# Patient Record
Sex: MALE | Race: WHITE | Employment: FULL TIME | URBAN - METROPOLITAN AREA
[De-identification: names, ages, dates, MRNs, and addresses within clinical notes are randomized per-mention and may not be internally consistent; named-entity substitution may affect disease eponyms.]

---

## 2022-07-02 ENCOUNTER — APPOINTMENT (OUTPATIENT)
Dept: CT IMAGING | Age: 52
DRG: 066 | End: 2022-07-02
Payer: COMMERCIAL

## 2022-07-02 ENCOUNTER — HOSPITAL ENCOUNTER (INPATIENT)
Age: 52
LOS: 1 days | Discharge: HOME | DRG: 066 | End: 2022-07-03
Attending: EMERGENCY MEDICINE | Admitting: PSYCHIATRY & NEUROLOGY
Payer: COMMERCIAL

## 2022-07-02 ENCOUNTER — APPOINTMENT (OUTPATIENT)
Dept: MRI IMAGING | Age: 52
DRG: 066 | End: 2022-07-02
Payer: COMMERCIAL

## 2022-07-02 DIAGNOSIS — I63.511 ACUTE RIGHT MCA STROKE (HCC): Primary | ICD-10-CM

## 2022-07-02 DIAGNOSIS — I63.511 ACUTE ISCHEMIC RIGHT MIDDLE CEREBRAL ARTERY (MCA) STROKE (HCC): ICD-10-CM

## 2022-07-02 PROBLEM — I63.9 STROKE DETERMINED BY CLINICAL ASSESSMENT (HCC): Status: ACTIVE | Noted: 2022-07-02

## 2022-07-02 LAB
ABSOLUTE EOS #: 0.14 K/UL (ref 0–0.44)
ABSOLUTE IMMATURE GRANULOCYTE: 0.04 K/UL (ref 0–0.3)
ABSOLUTE LYMPH #: 2.46 K/UL (ref 1.1–3.7)
ABSOLUTE MONO #: 0.75 K/UL (ref 0.1–1.2)
ANION GAP SERPL CALCULATED.3IONS-SCNC: 10 MMOL/L (ref 9–17)
BASOPHILS # BLD: 1 % (ref 0–2)
BASOPHILS ABSOLUTE: 0.05 K/UL (ref 0–0.2)
BUN BLDV-MCNC: 9 MG/DL (ref 6–20)
CALCIUM SERPL-MCNC: 8.6 MG/DL (ref 8.6–10.4)
CHLORIDE BLD-SCNC: 107 MMOL/L (ref 98–107)
CO2: 26 MMOL/L (ref 20–31)
CREAT SERPL-MCNC: 0.8 MG/DL (ref 0.7–1.2)
EOSINOPHILS RELATIVE PERCENT: 2 % (ref 1–4)
GFR AFRICAN AMERICAN: >60 ML/MIN
GFR NON-AFRICAN AMERICAN: >60 ML/MIN
GFR SERPL CREATININE-BSD FRML MDRD: ABNORMAL ML/MIN/{1.73_M2}
GLUCOSE BLD-MCNC: 104 MG/DL (ref 70–99)
HCT VFR BLD CALC: 44.5 % (ref 40.7–50.3)
HEMOGLOBIN: 14.9 G/DL (ref 13–17)
IMMATURE GRANULOCYTES: 0 %
INR BLD: 1.1
LYMPHOCYTES # BLD: 27 % (ref 24–43)
MCH RBC QN AUTO: 30.5 PG (ref 25.2–33.5)
MCHC RBC AUTO-ENTMCNC: 33.5 G/DL (ref 28.4–34.8)
MCV RBC AUTO: 91 FL (ref 82.6–102.9)
MONOCYTES # BLD: 8 % (ref 3–12)
MYOGLOBIN: 54 NG/ML (ref 28–72)
NRBC AUTOMATED: 0 PER 100 WBC
PARTIAL THROMBOPLASTIN TIME: 24.7 SEC (ref 20.5–30.5)
PDW BLD-RTO: 12.5 % (ref 11.8–14.4)
PLATELET # BLD: 189 K/UL (ref 138–453)
PMV BLD AUTO: 11.2 FL (ref 8.1–13.5)
POTASSIUM SERPL-SCNC: 4 MMOL/L (ref 3.7–5.3)
PROTHROMBIN TIME: 11.2 SEC (ref 9.1–12.3)
RBC # BLD: 4.89 M/UL (ref 4.21–5.77)
SEG NEUTROPHILS: 62 % (ref 36–65)
SEGMENTED NEUTROPHILS ABSOLUTE COUNT: 5.58 K/UL (ref 1.5–8.1)
SODIUM BLD-SCNC: 143 MMOL/L (ref 135–144)
TOTAL CK: 217 U/L (ref 39–308)
TROPONIN, HIGH SENSITIVITY: <6 NG/L (ref 0–22)
WBC # BLD: 9 K/UL (ref 3.5–11.3)

## 2022-07-02 PROCEDURE — 85025 COMPLETE CBC W/AUTO DIFF WBC: CPT

## 2022-07-02 PROCEDURE — 2060000002 HC BURN ICU INTERMEDIATE R&B

## 2022-07-02 PROCEDURE — 85610 PROTHROMBIN TIME: CPT

## 2022-07-02 PROCEDURE — 82553 CREATINE MB FRACTION: CPT

## 2022-07-02 PROCEDURE — 99285 EMERGENCY DEPT VISIT HI MDM: CPT

## 2022-07-02 PROCEDURE — 83874 ASSAY OF MYOGLOBIN: CPT

## 2022-07-02 PROCEDURE — 82550 ASSAY OF CK (CPK): CPT

## 2022-07-02 PROCEDURE — 70551 MRI BRAIN STEM W/O DYE: CPT

## 2022-07-02 PROCEDURE — 6370000000 HC RX 637 (ALT 250 FOR IP): Performed by: STUDENT IN AN ORGANIZED HEALTH CARE EDUCATION/TRAINING PROGRAM

## 2022-07-02 PROCEDURE — 80048 BASIC METABOLIC PNL TOTAL CA: CPT

## 2022-07-02 PROCEDURE — 93005 ELECTROCARDIOGRAM TRACING: CPT | Performed by: STUDENT IN AN ORGANIZED HEALTH CARE EDUCATION/TRAINING PROGRAM

## 2022-07-02 PROCEDURE — 85730 THROMBOPLASTIN TIME PARTIAL: CPT

## 2022-07-02 PROCEDURE — 84484 ASSAY OF TROPONIN QUANT: CPT

## 2022-07-02 PROCEDURE — 99223 1ST HOSP IP/OBS HIGH 75: CPT | Performed by: PSYCHIATRY & NEUROLOGY

## 2022-07-02 PROCEDURE — 70496 CT ANGIOGRAPHY HEAD: CPT

## 2022-07-02 PROCEDURE — 6360000004 HC RX CONTRAST MEDICATION: Performed by: EMERGENCY MEDICINE

## 2022-07-02 PROCEDURE — 6370000000 HC RX 637 (ALT 250 FOR IP): Performed by: PSYCHIATRY & NEUROLOGY

## 2022-07-02 RX ORDER — CLOPIDOGREL 300 MG/1
300 TABLET, FILM COATED ORAL ONCE
Status: COMPLETED | OUTPATIENT
Start: 2022-07-02 | End: 2022-07-02

## 2022-07-02 RX ORDER — LABETALOL HYDROCHLORIDE 5 MG/ML
10 INJECTION, SOLUTION INTRAVENOUS EVERY 10 MIN PRN
Status: DISCONTINUED | OUTPATIENT
Start: 2022-07-02 | End: 2022-07-03 | Stop reason: HOSPADM

## 2022-07-02 RX ORDER — ATORVASTATIN CALCIUM 80 MG/1
40 TABLET, FILM COATED ORAL NIGHTLY
Status: DISCONTINUED | OUTPATIENT
Start: 2022-07-02 | End: 2022-07-03 | Stop reason: HOSPADM

## 2022-07-02 RX ORDER — POLYETHYLENE GLYCOL 3350 17 G/17G
17 POWDER, FOR SOLUTION ORAL DAILY PRN
Status: DISCONTINUED | OUTPATIENT
Start: 2022-07-02 | End: 2022-07-03 | Stop reason: HOSPADM

## 2022-07-02 RX ORDER — ASPIRIN 81 MG/1
81 TABLET ORAL DAILY
Status: DISCONTINUED | OUTPATIENT
Start: 2022-07-02 | End: 2022-07-03 | Stop reason: HOSPADM

## 2022-07-02 RX ORDER — ENOXAPARIN SODIUM 100 MG/ML
30 INJECTION SUBCUTANEOUS 2 TIMES DAILY
Status: DISCONTINUED | OUTPATIENT
Start: 2022-07-02 | End: 2022-07-03 | Stop reason: HOSPADM

## 2022-07-02 RX ORDER — CLOPIDOGREL BISULFATE 75 MG/1
75 TABLET ORAL DAILY
Status: DISCONTINUED | OUTPATIENT
Start: 2022-07-03 | End: 2022-07-03 | Stop reason: HOSPADM

## 2022-07-02 RX ORDER — ONDANSETRON 2 MG/ML
4 INJECTION INTRAMUSCULAR; INTRAVENOUS EVERY 6 HOURS PRN
Status: DISCONTINUED | OUTPATIENT
Start: 2022-07-02 | End: 2022-07-03 | Stop reason: HOSPADM

## 2022-07-02 RX ORDER — ASPIRIN 300 MG/1
300 SUPPOSITORY RECTAL DAILY
Status: DISCONTINUED | OUTPATIENT
Start: 2022-07-02 | End: 2022-07-03 | Stop reason: HOSPADM

## 2022-07-02 RX ORDER — ONDANSETRON 4 MG/1
4 TABLET, ORALLY DISINTEGRATING ORAL EVERY 8 HOURS PRN
Status: DISCONTINUED | OUTPATIENT
Start: 2022-07-02 | End: 2022-07-03 | Stop reason: HOSPADM

## 2022-07-02 RX ADMIN — Medication 81 MG: at 16:16

## 2022-07-02 RX ADMIN — CLOPIDOGREL BISULFATE 300 MG: 300 TABLET, FILM COATED ORAL at 14:51

## 2022-07-02 RX ADMIN — IOPAMIDOL 90 ML: 755 INJECTION, SOLUTION INTRAVENOUS at 12:47

## 2022-07-02 ASSESSMENT — ENCOUNTER SYMPTOMS
SHORTNESS OF BREATH: 0
RHINORRHEA: 0
SORE THROAT: 0
COUGH: 0
NAUSEA: 0
ABDOMINAL PAIN: 0
VOMITING: 0
DIARRHEA: 0

## 2022-07-02 ASSESSMENT — PAIN - FUNCTIONAL ASSESSMENT: PAIN_FUNCTIONAL_ASSESSMENT: NONE - DENIES PAIN

## 2022-07-02 NOTE — PROGRESS NOTES
[facility]     Emergency/Trauma Note    PATIENT NAME: Marsisa Velazquez    Shift date: 07/02/2022  Shift day: Saturday   Shift # 1    Room # 16/16   Name: Marissa Velazquez            Age: 47 y.o. Gender: male          Sikh: None   Place of Alevism:     Trauma/Incident type: Stroke Consult  Admit Date & Time: 7/2/2022 12:22 PM  TRAUMA NAME: None    ADVANCE DIRECTIVES IN CHART? No    NAME OF DECISION MAKER: Unknown    RELATIONSHIP OF DECISION MAKER TO PATIENT:     PATIENT/EVENT DESCRIPTION:  Marissa Velazquez is a 47 y.o. male who arrived ED as stroke consult. Patient to be admitted to 16/16. SPIRITUAL ASSESSMENT-INTERVENTION-OUTCOME:  No spiritual assessment was carried. However, patient was receptive to spiritual care and open to prayer. Family was not present at the time.  maintained listening presence, offered support, prayed with patient and reassured him that he was in good hands. Patient expressed appreciation for the spiritual support he received. PATIENT BELONGINGS:  This  did not handle patient's belongings. ANY BELONGINGS OF SIGNIFICANT VALUE NOTED:  Unknown    REGISTRATION STAFF NOTIFIED? Yes    WHAT IS YOUR SPIRITUAL CARE PLAN FOR THIS PATIENT?:  Follow up visits recommended for ongoing assessment of patient's condition and for more prayers and support. Electronically signed by Fr. Jennifer Huerta on 7/2/2022 at 3:19 PM.  Patrick Swenson  [phone]

## 2022-07-02 NOTE — CONSULTS
Department of Endovascular Neurosurgery                                                                                                                      Resident Consult Note  Stroke Alert paged @ 12:22 Pm   ER Room #  16  Arrival to patient bedside @ 12:30 Pm        Reason for Consult:   Left facial numbness   Requesting Physician:   Jennifer Zaragoza   Endovascular Neurosurgeon:   []Dr. Dmitriy Ford  [x]Dr. Sebastien Porter  []Dr. Dexter Bryan   []    History Obtained From:  patient    CHIEF COMPLAINT:       Sudden onset of left perioral numbness, slurring of speech, dysphagia    HISTORY OF PRESENT ILLNESS:       The patient is a 47 y.o. male who presents with sudden onset of left perioral numbness, slurring of speech, dysphagia to [facility]. Telestroke consulted CT head subtle hypodensity right frontal , patient transferred to [facility] for further management. Loaded with  aspirin 325 before transfer    Last know well: 5 PM on 91/2022  47year-old-year-old male without significant past medical history developed sudden onset of left perioral numbness, slurring of speech on 7/1/2022, around 5 PM later that evening also developed difficulty in swallowing. At [facility] facility blood pressure 150s, blood glucose 150. Patient was given aspirin and transferred to [facility]. On presentation:  BP: 140's  BSL: 104   CT head and neck: Pending  NIH 3      Patient tested positive for Covid-19 2 weeks ago and completed quarantine     Prior to arrival patient was on  Antiplatelets/anticoagulants: No  Statins: No    Smoking history: non-smoker       PAST MEDICAL HISTORY :       Past Medical History:    History reviewed. No pertinent past medical history. Past Surgical History:    History reviewed. No pertinent surgical history.     Social History:   Social History     Socioeconomic History    Marital status:      Spouse name: Not on file    Number of children: Not on file    Years of education: Not on file    Highest education level: Not on file   Occupational History    Not on file   Tobacco Use    Smoking status: Never Smoker    Smokeless tobacco: Never Used   Substance and Sexual Activity    Alcohol use: Yes     Comment: 4 beers daily    Drug use: Never    Sexual activity: Not on file   Other Topics Concern    Not on file   Social History Narrative    Not on file     Social Determinants of Health     Financial Resource Strain:     Difficulty of Paying Living Expenses: Not on file   Food Insecurity:     Worried About Running Out of Food in the Last Year: Not on file    [employer] of Food in the Last Year: Not on file   Transportation Needs:     Lack of Transportation (Medical): Not on file    Lack of Transportation (Non-Medical): Not on file   Physical Activity:     Days of Exercise per Week: Not on file    Minutes of Exercise per Session: Not on file   Stress:     Feeling of Stress : Not on file   Social Connections:     Frequency of Communication with Friends and Family: Not on file    Frequency of Social Gatherings with Friends and Family: Not on file    Attends Sikh Services: Not on file    Active Member of [address] [place] [address] [employer] or Organizations: Not on file    Attends Club or Organization Meetings: Not on file    Marital Status: Not on file   Intimate Partner Violence:     Fear of Current or Ex-Partner: Not on file    Emotionally Abused: Not on file    Physically Abused: Not on file    Sexually Abused: Not on file   Housing Stability:     Unable to Pay for Housing in the Last Year: Not on file    Number of [place] in the Last Year: Not on file    Unstable Housing in the Last Year: Not on file       Family History:   History reviewed. No pertinent family history. Allergies:  Patient has no known allergies.     Home Medications:  Prior to Admission medications    Not on File       Current Medications:   No current facility-administered medications for this encounter. REVIEW OF SYSTEMS:       CONSTITUTIONAL: negative for fatigue and malaise   EYES: negative for double vision and photophobia    HEENT: negative for tinnitus and sore throat   RESPIRATORY: negative for cough, shortness of breath   CARDIOVASCULAR: negative for chest pain, palpitations   GASTROINTESTINAL: negative for nausea, vomiting   GENITOURINARY: negative for incontinence   MUSCULOSKELETAL: negative for neck or back pain   NEUROLOGICAL: negative for seizures   PSYCHIATRIC: negative for fatigue     Review of systems otherwise negative. PHYSICAL EXAM:       BP (!) 146/81   Pulse 71   Temp 97.4 °F (36.3 °C) (Oral)   Resp 13   Wt 240 lb (108.9 kg)   SpO2 98%     CONSTITUTIONAL:  Well developed, well nourished, alert and oriented x 3, in no acute distress. GCS 15, nontoxic. Mild  Dysarthria , no aphasia .    HEAD:  normocephalic, atraumatic    EYES:  PERRLA, EOMI.   ENT:  moist mucous membranes   NECK:  supple, symmetric, no midline tenderness to palpation    BACK:  without midline tenderness, step-offs or deformities    LUNGS:  Equal air entry bilaterally   CARDIOVASCULAR:  normal s1 / s2   ABDOMEN:  Soft, no rigidity   NEUROLOGIC:  Mental Status:  A & O x3,awake             Cranial Nerves:    cranial nerves II-XII are grossly intact    Motor Exam:    Drift:  absent  Tone:  normal    Motor exam is symmetrical 5 out of 5 all extremities bilaterally    Sensory:    Touch:    Right Upper Extremity:  normal  Left Upper Extremity:  normal  Right Lower Extremity:  normal  Left Lower Extremity:  normal    Deep Tendon Reflexes:    Right Bicep:  2+  Left Bicep:  2+  Right Knee:  2+  Left Knee:  2+    Plantar Response:  Right:  downgoing  Left:  downgoing    Clonus:  absent  Haskins's:  absent    Coordination/Dysmetria:  Heel to Shin:  Right:  normal  Finger to Nose:   Right:  normal  Left:  normal   Dysdiadochokinesia:  absent    Gait:  Deferred     INITIAL NIH STROKE SCALE:    Time Performed:  12:35  PM     1a. Level of consciousness:  0 - alert; keenly responsive  1b. Level of consciousness questions:  0 - answers both questions correctly  1c. Level of consciousness questions:  0 - performs both tasks correctly  2. Best Gaze:  0 - normal  3. Visual:  0 - no visual loss  4. Facial Palsy:  1 - minor paralysis (flattened nasolabial fold, asymmetric on smiling)  5a. Motor left arm:  0 - no drift, limb holds 90 (or 45) degrees for full 10 seconds  5b. Motor right arm:  0 - no drift, limb holds 90 (or 45) degrees for full 10 seconds  6a. Motor left le - no drift; leg holds 30 degree position for full 5 seconds  6b. Motor right le - no drift; leg holds 30 degree position for full 5 seconds  7. Limb Ataxia:  0 - absent  8. Sensory:  1 - mild to moderate sensory loss; patient feels pinprick is less sharp or is dull on the affected side; there is a loss of superficial pain with pinprick but patient is aware of being touched   9. Best Language:  0 - no aphasia, normal  10. Dysarthria:  1 - mild to moderate, patient slurs at least some words and at worst, can be understood with some difficulty  11.   Extinction and Inattention:  0 - no abnormality    TOTAL:  3     SKIN:  no rash      Modified Frio Score Scale:     [x] Zero: No symptoms at all   [] 1: No significant disability despite symptoms; able to carry out all usual duties and activities   [] 2: Slight disability; unable to carry out all previous activities, but able to look after own affairs without assistance   [] 3:Moderate disability; requiring some help, but able to walk without assistance   [] 4: Moderately severe disability; unable to walk and attend to bodily needs without assistance   [] 5:Severe disability; bedridden, incontinent and requiring constant nursing care and attention    LABS AND IMAGING:     CBC with Differential:    Lab Results   Component Value Date/Time    WBC 9.0 07/02/2022 12:37 PM    RBC 4.89 07/02/2022 12:37 PM    HGB 14.9 07/02/2022 12:37 PM    HCT 44.5 07/02/2022 12:37 PM     07/02/2022 12:37 PM    MCV 91.0 07/02/2022 12:37 PM    MCH 30.5 07/02/2022 12:37 PM    MCHC 33.5 07/02/2022 12:37 PM    RDW 12.5 07/02/2022 12:37 PM    LYMPHOPCT 27 07/02/2022 12:37 PM    MONOPCT 8 07/02/2022 12:37 PM    BASOPCT 1 07/02/2022 12:37 PM    MONOSABS 0.75 07/02/2022 12:37 PM    LYMPHSABS 2.46 07/02/2022 12:37 PM    EOSABS 0.14 07/02/2022 12:37 PM    BASOSABS 0.05 07/02/2022 12:37 PM         BMP:    Lab Results   Component Value Date/Time     07/02/2022 12:37 PM    K 4.0 07/02/2022 12:37 PM     07/02/2022 12:37 PM    CO2 26 07/02/2022 12:37 PM    BUN 9 07/02/2022 12:37 PM    CREATININE 0.80 07/02/2022 12:37 PM    CALCIUM 8.6 07/02/2022 12:37 PM    GFRAA >60 07/02/2022 12:37 PM    LABGLOM >60 07/02/2022 12:37 PM    GLUCOSE 104 07/02/2022 12:37 PM       Radiology Review:    1.) CT brain without contrast: At outlying facility  No evidence of hemorrhage. Very subtle cortical hypodensity in the right frontal lobe within right MCA distribution quickly presented with a subtle acute cortical infarct. 2.) CTA Head/Neck:    No LVO     3.) Brain MRI W/O:        4.) ECHO with bubble study: Pending     ASSESSMENT AND PLAN:     There is no problem list on file for this patient. Assessment                 47 y.o. male who presents with sudden onset of left perioral numbness, slurring of speech and later on developed  swallowing difficulty    1. Last Known Well (date and time): 1700 7/1/2022    2. Candidate for IV tPA therapy     Yes []     No  [x] due to the following exclusion criteria:  Out of time window     3.  Candidate for Thrombectomy    Yes []      No [x] due to the following exclusion criteria: NO LVO     - Discussed with Dr. Alla Campos      Recommendations:    [x] General Neurology Care Status - prefer 5th floor (5A/5C)   [] Internal Medicine General Care Status [] NICU Status - (5B)     [] MICU Status   [] Observation Status    Please use the following admission order set for stroke admission:   [] [id number] - ALISHA Intercerebral Hemorrhage Admission   [] [id number] - ALISHA Sub Arachnoid Hemorrhage Admission   [] [id number] - ALISHA Ischemic Stroke TPA Treatment Focused   [] [id number] - IP Ischemic Stroke ICU Post Alteplase (TPA) Admission    [] [id number] - GEN Ischemic Stroke Non-Thrombolytic Focussed      Imaging   - CT Head  WO : done   - CTA Head and Neck : done   - MRI Brain WO : Done   - ECHO with bubble study: Pending       Medications   -Aspirin 325 x 1   -  Aspirin 81  mg daily   - Folic acid 1mg BID  - Atorvastatin 40 mg nightly     Labs  - Fasting Lipid panel  - HgbA1c lab      - PT, OT, Speech eval   - Hydrate with 250cc bolus then IVF NS @ 75cc/hr   - Telemetry   - Neuro checks per protocol  - We recommend SBP <220  - Blood glucose goal less than 180  - Please avoid dextrose containing solutions        Additional recommendations may follow    Please contact [facility] with any changes in patients neurologic status. Thank you for your consult.        Jude Ruelas MD   PGY 3 Neurology Resident  7/2/2022 at 1:39 PM

## 2022-07-02 NOTE — ED PROVIDER NOTES
[facility]     Emergency Department     Faculty Attestation    I performed a history and physical examination of the patient and discussed management with the resident. I have reviewed and agree with the residents findings including all diagnostic interpretations, and treatment plans as written at the time of my review. Any areas of disagreement are noted on the chart. I was personally present for the key portions of any procedures. I have documented in the chart those procedures where I was not present during the key portions. For Physician Assistant/ Nurse Practitioner cases/documentation I have personally evaluated this patient and have completed at least one if not all key elements of the E/M (history, physical exam, and MDM). Additional findings are as noted. This patient was evaluated in the Emergency Department for symptoms described in the history of present illness. The patient was evaluated in the context of the global COVID-19 pandemic, which necessitated consideration that the patient might be at risk for infection with the SARS-CoV-2 virus that causes COVID-19. Institutional protocols and algorithms that pertain to the evaluation of patients at risk for COVID-19 are in a state of rapid change based on information released by regulatory bodies including the CDC and federal and state organizations. These policies and algorithms were followed during the patient's care in the ED. Primary Care Physician: No primary care provider on file. History: This is a 47 y.o. male who presents to the Emergency Department with complaint of left-sided lip and tongue tingling as well as facial tingling facial droop and dysarthria. This began approximately 1700 hrs. yesterday. Patient was seen outlying facility CT scan was concerning for a hypodense area in the brain and the patient was transferred for further neurological evaluation.     Physical: weight is 240 lb (108.9 kg). His oral temperature is 97.4 °F (36.3 °C). His blood pressure is 146/81 (abnormal) and his pulse is 71. His respiration is 13 and oxygen saturation is 98%. Patient does have some dysarthria, left facial droop is mild,    Impression: CVA    Plan: Stroke alert was paged out on the patient upon his arrival.  Further evaluation testing and disposition per the stroke team.      CRITICAL CARE: There was a high probability of clinically significant/life threatening deterioration in this patient's condition which required my urgent intervention. Total critical care time was 5 minutes. This excludes any time for separately reportable procedures. (Please note that portions of this note were completed with a voice recognition program.  Efforts were made to edit the dictations but occasionally words are mis-transcribed.)    Jeancarlos Zapien.  Rahul Walker MD, [facility]  Attending Emergency Medicine Physician        Roland Rinne, MD  07/02/22 1033

## 2022-07-02 NOTE — ED NOTES
Pt. Resting on stretcher, eyes open, RR even and non-labored  Pt.  Updated on POC  Will continue to monitor   Family remains at bedside      Antoine Dang RN  07/02/22 [id number]

## 2022-07-02 NOTE — ED NOTES
Pt. Arrives to ED via [facility] from [place] for stroke consult. LKWT 5pm yesterday with facial numbness and slurred speech. Pt. States on arrival slurred speech is still present, numbness improving.        Tali Byrd RN  07/02/22 0775

## 2022-07-02 NOTE — H&P
[facility] Neurology   [address]    HISTORY AND PHYSICAL EXAMINATION            Date:   7/2/2022  Patient name:  Ana María Low  Date of admission:  7/2/2022 12:22 PM  MRN:   [id number]  Account:  [de-identified]  YOB: 1970  PCP:    No primary care provider on file. Room:   [facility]  Code Status:    No Order    History Obtained From:  patient    CHIEF COMPLAINT:       Sudden onset of left perioral numbness, slurring of speech, dysphagia    HISTORY OF PRESENT ILLNESS:       The patient is a 47 y.o. male who presents with sudden onset of left perioral numbness, slurring of speech[facility] Tel[facility]roke consulted CT head subtle hypodensity right frontal , patient transferred to [facility] for further management. Patient was loaded with aspirin 325 before transfer. Last know well: 5 PM on 91/2022  47year-old-year-old male without significant past medical history developed sudden onset of left perioral numbness, slurring of speech on 7/1/2022, around 5 PM later that evening also developed difficulty in swallowing. At [facility] facility blood pressure 150s, blood glucose 150. Patient was given aspirin and transferred to [facility]. On presentation:  BP: 140's  BSL: 104   CT head and neck: Pending  NIH 3      Patient tested positive for Covid-19 2 weeks ago and completed quarantine     Prior to arrival patient was on  Antiplatelets/anticoagulants: No  Statins: No    Smoking history: non-smoker       PAST MEDICAL HISTORY :       Past Medical History:    History reviewed. No pertinent past medical history. Past Surgical History:    History reviewed. No pertinent surgical history.     Social History:   Social History     Socioeconomic History    Marital status:      Spouse name: Not on file    Number of children: Not on file    Years of education: Not on file    Highest education level: Not on file   Occupational History  Not on file   Tobacco Use    Smoking status: Never Smoker    Smokeless tobacco: Never Used   Substance and Sexual Activity    Alcohol use: Yes     Comment: 4 beers daily    Drug use: Never    Sexual activity: Not on file   Other Topics Concern    Not on file   Social History Narrative    Not on file     Social Determinants of Health     Financial Resource Strain:     Difficulty of Paying Living Expenses: Not on file   Food Insecurity:     Worried About Running Out of Food in the Last Year: Not on file    Jessica of Food in the Last Year: Not on file   Transportation Needs:     Lack of Transportation (Medical): Not on file    Lack of Transportation (Non-Medical): Not on file   Physical Activity:     Days of Exercise per Week: Not on file    Minutes of Exercise per Session: Not on file   Stress:     Feeling of Stress : Not on file   Social Connections:     Frequency of Communication with Friends and Family: Not on file    Frequency of Social Gatherings with Friends and Family: Not on file    Attends [employer] Services: Not on file    Active Member of [employer] or Organizations: Not on file    Attends Club or Organization Meetings: Not on file    Marital Status: Not on file   Intimate Partner Violence:     Fear of Current or Ex-Partner: Not on file    Emotionally Abused: Not on file    Physically Abused: Not on file    Sexually Abused: Not on file   Housing Stability:     Unable to Pay for Housing in the Last Year: Not on file    Number of [place] in the Last Year: Not on file    Unstable Housing in the Last Year: Not on file       Family History:   History reviewed. No pertinent family history. Allergies:  Patient has no known allergies. Home Medications:  Prior to Admission medications    Not on File       Current Medications:   No current facility-administered medications for this encounter.     REVIEW OF SYSTEMS:       CONSTITUTIONAL: negative for fatigue and malaise   EYES: 0 - answers both questions correctly  1c. Level of consciousness questions:  0 - performs both tasks correctly  2. Best Gaze:  0 - normal  3. Visual:  0 - no visual loss  4. Facial Palsy:  1 - minor paralysis (flattened nasolabial fold, asymmetric on smiling)  5a. Motor left arm:  0 - no drift, limb holds 90 (or 45) degrees for full 10 seconds  5b. Motor right arm:  0 - no drift, limb holds 90 (or 45) degrees for full 10 seconds  6a. Motor left le - no drift; leg holds 30 degree position for full 5 seconds  6b. Motor right le - no drift; leg holds 30 degree position for full 5 seconds  7. Limb Ataxia:  0 - absent  8. Sensory:  1 - mild to moderate sensory loss; patient feels pinprick is less sharp or is dull on the affected side; there is a loss of superficial pain with pinprick but patient is aware of being touched   9. Best Language:  0 - no aphasia, normal  10. Dysarthria:  1 - mild to moderate, patient slurs at least some words and at worst, can be understood with some difficulty  11.   Extinction and Inattention:  0 - no abnormality    TOTAL:  3     SKIN:  no rash      Modified Beach Score Scale:     [x] Zero: No symptoms at all   [] 1: No significant disability despite symptoms; able to carry out all usual duties and activities   [] 2: Slight disability; unable to carry out all previous activities, but able to look after own affairs without assistance   [] 3:Moderate disability; requiring some help, but able to walk without assistance   [] 4: Moderately severe disability; unable to walk and attend to bodily needs without assistance   [] 5:Severe disability; bedridden, incontinent and requiring constant nursing care and attention    LABS AND IMAGING:     CBC with Differential:    Lab Results   Component Value Date/Time    WBC 9.0 2022 12:37 PM    RBC 4.89 2022 12:37 PM    HGB 14.9 2022 12:37 PM    HCT 44.5 2022 12:37 PM     2022 12:37 PM MCV 91.0 07/02/2022 12:37 PM    MCH 30.5 07/02/2022 12:37 PM    MCHC 33.5 07/02/2022 12:37 PM    RDW 12.5 07/02/2022 12:37 PM    LYMPHOPCT 27 07/02/2022 12:37 PM    MONOPCT 8 07/02/2022 12:37 PM    BASOPCT 1 07/02/2022 12:37 PM    MONOSABS 0.75 07/02/2022 12:37 PM    LYMPHSABS 2.46 07/02/2022 12:37 PM    EOSABS 0.14 07/02/2022 12:37 PM    BASOSABS 0.05 07/02/2022 12:37 PM         BMP:    Lab Results   Component Value Date/Time     07/02/2022 12:37 PM    K 4.0 07/02/2022 12:37 PM     07/02/2022 12:37 PM    CO2 26 07/02/2022 12:37 PM    BUN 9 07/02/2022 12:37 PM    CREATININE 0.80 07/02/2022 12:37 PM    CALCIUM 8.6 07/02/2022 12:37 PM    GFRAA >60 07/02/2022 12:37 PM    LABGLOM >60 07/02/2022 12:37 PM    GLUCOSE 104 07/02/2022 12:37 PM       Radiology Review:    1.) CT brain without contrast: At outlying facility  No evidence of hemorrhage. Very subtle cortical hypodensity in the right frontal lobe within right MCA distribution quickly presented with a subtle acute cortical infarct. 2.) CTA Head/Neck:    No LVO     3.) Brain MRI W/O:    1. Acute infarct in the right frontal lobe.  No evidence of hemorrhagic   transformation. 2. Involutional parenchymal changes with mild microvascular ischemic disease. 4.) ECHO with bubble study: Pending     ASSESSMENT AND PLAN:     There is no problem list on file for this patient. Assessment                 47 y.o. male who presents with sudden onset of left perioral numbness, slurring of speech and later on developed  swallowing difficulty    1. Last Known Well (date and time): 1700   7/1/2022    2. Candidate for IV tPA therapy     Yes []     No  [x] due to the following exclusion criteria:  Out of time window     3.  Candidate for Thrombectomy    Yes []      No [x] due to the following exclusion criteria: NO LVO     - Discussed with Dr. Isa Summers      Recommendations:    [x] General Neurology Care Status - prefer 5th floor (5A/5C)   [] Internal Medicine General Care Status   [] NICU Status - (5B)     [] MICU Status   [] Observation Status    Please use the following admission order set for stroke admission:   [] [id number] - ALISHA Intercerebral Hemorrhage Admission   [] [id number] - ALISHA Sub Arachnoid Hemorrhage Admission   [] [id number] - ALISHA Ischemic Stroke TPA Treatment Focused   [] [id number] - IP Ischemic Stroke ICU Post Alteplase (TPA) Admission    [] [id number] - GEN Ischemic Stroke Non-Thrombolytic Focussed      Imaging   - CT Head  WO : done   - CTA Head and Neck : done   - MRI Brain WO : Done   - ECHO with bubble study: Pending       Medications   -Patient was loaded with aspirin 325  -  Aspirin 81  mg daily   - Folic acid 1mg BID  - Atorvastatin 40 mg nightly     Labs  - Fasting Lipid panel  - HgbA1c lab      - PT, OT, Speech eval   - Hydrate with 250cc bolus then IVF NS @ 75cc/hr   - Telemetry   - Neuro checks per protocol  - We recommend SBP <220  - Blood glucose goal less than 180  - Please avoid dextrose containing solutions           Radha Kovacs MD   PGY 3 Neurology Resident  7/2/2022 at 2:05 PM

## 2022-07-02 NOTE — ED PROVIDER NOTES
[address]  ED  Emergency Department Encounter  Emergency Medicine Resident     Pt Name: Terry You  MRN: [id number]  [place] 1970  Date of evaluation: 7/2/22  PCP:  No primary care provider on file. CHIEF COMPLAINT       Chief Complaint   Patient presents with    Aphasia     LKWT 5pm yesterday, Prem transfer       HISTORY OFPRESENT ILLNESS  (Location/Symptom, Timing/Onset, Context/Setting, Quality, Duration, Modifying Factors,Severity.)      Terry You is a 47 y.o. male who presents as a transfer from [facility].  Patient says that yesterday around 5 PM he noticed some left-sided lip and tongue tingling and left-sided facial tingling. He then noticed some facial droop and slurred speech. He was seen at outlying facility, had CT scan performed which was notable for hypodense area in the brain concerning for stroke. Patient was transferred here for stroke evaluation. He denies any recent past medical history other than COVID infection 2 weeks ago. He is currently asymptomatic. No known medical history of hypertension, hyperlipidemia, diabetes. He says that his dad had a stroke and his mom has a history of hypertension. He did not take any medications, is not on any blood thinners. PAST MEDICAL / SURGICAL / SOCIAL / FAMILY HISTORY      has no past medical history on file. has no past surgical history on file. Social:  reports that he has never smoked. He has never used smokeless tobacco. He reports current alcohol use. He reports that he does not use drugs. Family Hx: History reviewed. No pertinent family history. Allergies:  Patient has no known allergies. Home Medications:  Prior to Admission medications    Not on File       REVIEW OFSYSTEMS    (2-9 systems for level 4, 10 or more for level 5)      Review of Systems   Constitutional: Negative for appetite change, chills, fatigue and fever.    HENT: Negative for congestion, rhinorrhea, sneezing and sore throat. Eyes: Negative for visual disturbance. Respiratory: Negative for cough and shortness of breath. Cardiovascular: Negative for chest pain and leg swelling. Gastrointestinal: Negative for abdominal pain, diarrhea, nausea and vomiting. Genitourinary: Negative for dysuria. Musculoskeletal: Negative for myalgias, neck pain and neck stiffness. Skin: Negative for rash and wound. Neurological: Positive for facial asymmetry, speech difficulty and numbness. Negative for dizziness, syncope, light-headedness and headaches. Psychiatric/Behavioral: Negative for dysphoric mood and suicidal ideas. PHYSICAL EXAM   (up to 7 for level 4, 8 or more forlevel 5)      INITIAL VITALS:   Vitals:    07/02/22 1706   BP:    Pulse:    Resp:    Temp:    SpO2: 97%        Physical Exam  Vitals and nursing note reviewed. Constitutional:       General: He is not in acute distress. Appearance: Normal appearance. He is normal weight. He is not ill-appearing, toxic-appearing or diaphoretic. HENT:      Nose: Nose normal.      Mouth/Throat:      Mouth: Mucous membranes are moist.      Pharynx: Oropharynx is clear. Eyes:      Extraocular Movements: Extraocular movements intact. Conjunctiva/sclera: Conjunctivae normal.      Pupils: Pupils are equal, round, and reactive to light. Cardiovascular:      Rate and Rhythm: Normal rate and regular rhythm. Pulses: Normal pulses. Heart sounds: Normal heart sounds. Pulmonary:      Effort: Pulmonary effort is normal.      Breath sounds: Normal breath sounds. Abdominal:      General: There is no distension. Palpations: Abdomen is soft. Tenderness: There is no abdominal tenderness. There is no right CVA tenderness, left CVA tenderness or guarding. Musculoskeletal:         General: Normal range of motion. Cervical back: Normal range of motion and neck supple. Skin:     General: Skin is warm.       Capillary Refill: Capillary refill takes less than 2 seconds. Neurological:      Mental Status: He is alert and oriented to person, place, and time. GCS: GCS eye subscore is 4. GCS verbal subscore is 5. GCS motor subscore is 6. Cranial Nerves: Dysarthria and facial asymmetry present. Sensory: Sensory deficit present. Motor: Motor function is intact. Coordination: Coordination is intact. Comments: No involvement of extremities   Psychiatric:         Mood and Affect: Mood normal.         Behavior: Behavior normal.         DIFFERENTIAL  DIAGNOSIS     Initial MDM/Plan: 47 y.o. male who presents with left facial droop, left facial numbness and dysarthria with last known well 5 PM.  CT head at outlying facility showed hypodense area in the brain. NIH scale of 3 for facial numbness, facial paralysis and dysarthria. Physical examination otherwise unremarkable. Patient transferred here for stroke work-up. Will obtain stroke work-up, stroke alert noncritical.  Patient outside the tPA window. DIAGNOSTIC RESULTS / EMERGENCYDEPARTMENT COURSE / MDM     LABS:  Labs Reviewed   STROKE PANEL - Abnormal; Notable for the following components:       Result Value    Glucose 104 (*)     All other components within normal limits   URINE DRUG SCREEN   HEMOGLOBIN A1C     RADIOLOGY:  CTA HEAD NECK W CONTRAST    Result Date: 7/2/2022  EXAMINATION: CTA OF THE HEAD AND NECK WITH CONTRAST 7/2/2022 12:40 pm: TECHNIQUE: CTA of the head and neck was performed with the administration of intravenous contrast. Multiplanar reformatted images are provided for review. MIP images are provided for review. Stenosis of the internal carotid arteries measured using NASCET criteria. Automated exposure control, iterative reconstruction, and/or weight based adjustment of the mA/kV was utilized to reduce the radiation dose to as low as reasonably achievable. COMPARISON: None.  HISTORY: ORDERING SYSTEM PROVIDED HISTORY: LEFT FACIAL NUMBNESS TECHNOLOGIST PROVIDED HISTORY: LEFT FACIAL NUMBNESS Decision Support Exception - unselect if not a suspected or confirmed emergency medical condition->Emergency Medical Condition (MA) FINDINGS: CTA NECK: AORTIC ARCH/ARCH VESSELS: There is a normal branch pattern of the aortic arch. The innominate and subclavian arteries are patent. CAROTID ARTERIES: The common carotid and internal carotid arteries are normal in caliber without evidence of a flow-limiting stenosis or dissection. VERTEBRAL ARTERIES: The vertebral arteries are patent without evidence of a flow-limiting dist stenosis or dissection. SOFT TISSUES: The parapharyngeal fat spaces are preserved. No pharyngeal or laryngeal mass. Thyroid gland, submandibular, and parotid glands are unremarkable. No adenopathy the or soft tissue swelling. Limited images through the lung apices are unremarkable. BONES: Degenerative changes are noted in the spine. There is streak artifact related to dental amalgam.  There is likely sequela of remote C7 spinous process fracture. CTA HEAD: ANTERIOR CIRCULATION: Fibrocalcific plaque is noted in the carotid siphons without evidence of a flow-limiting stenosis. 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 The anterior and middle cerebral arteries are unremarkable. POSTERIOR CIRCULATION: Basilar artery is patent. The posterior cerebral arteries are unremarkable. OTHER: No gross dural venous sinus thrombosis. BRAIN: There are no areas of abnormal enhancement. No midline shift. Unremarkable CTA of the head and neck. MRI BRAIN WO CONTRAST    Result Date: 7/2/2022  EXAMINATION: MRI OF THE BRAIN WITHOUT CONTRAST  7/2/2022 1:33 pm TECHNIQUE: Multiplanar multisequence MRI of the brain was performed without the administration of intravenous contrast. COMPARISON: No MRI comparison available.  HISTORY: ORDERING SYSTEM PROVIDED HISTORY: R PERIORAL NUMBNESS AND MILD DYSPHAGIA TECHNOLOGIST PROVIDED HISTORY: R PERIORAL NUMBNESS AND MILD DYSPHAGIA Decision Support Exception - unselect if not a suspected or confirmed emergency medical condition->Emergency Medical Condition (MA) Reason for Exam: R PERIORAL NUMBNESS AND MILD DYSPHAGIA FINDINGS: INTRACRANIAL STRUCTURES/VENTRICLES: Acute infarct in the right frontal lobe. No evidence of hemorrhagic transformation. No mass effect or midline shift. No evidence of an acute intracranial hemorrhage. Scattered periventricular, deep, and subcortical white matter T2/FLAIR hyperintensities are nonspecific and likely related to microvascular ischemic disease. Involutional parenchymal changes. No evidence of hydrocephalus. The sellar/suprasellar regions appear unremarkable. The normal signal voids within the major intracranial vessels appear maintained. ORBITS: The visualized portion of the orbits demonstrate no acute abnormality. SINUSES: The visualized paranasal sinuses and mastoid air cells demonstrate no acute abnormality. BONES/SOFT TISSUES: The bone marrow signal intensity appears normal. The soft tissues demonstrate no acute abnormality. 1. Acute infarct in the right frontal lobe. No evidence of hemorrhagic transformation. 2. Involutional parenchymal changes with mild microvascular ischemic disease. EKG  EKG Interpretation    Interpreted by emergency department physician    Rhythm: normal sinus   Rate: normal  Axis: normal  Ectopy: none  Conduction: normal  ST Segments: normal  T Waves: normal  Q Waves: none    Clinical Impression: non-specific EKG    Isadora Venegas MD      All EKG's are interpreted by the Emergency Department Physicianwho either signs or Co-signs this chart in the absence of a cardiologist.    EMERGENCY DEPARTMENT COURSE:    admitted to neurology for acute right MCA stroke      PROCEDURES:  None    CONSULTS:  IP CONSULT TO PHYSICAL MEDICINE REHAB      FINAL IMPRESSION      1.  Acute right MCA stroke ([address]        DISPOSITION / PLAN     DISPOSITION Admitted 07/02/2022 02:20:50 PM      PATIENT REFERRED TO:  No follow-up provider specified.     DISCHARGE MEDICATIONS:  New Prescriptions    No medications on file       Phyllis Tesfaye MD  Emergency Medicine Resident    (Please note that portions of this note were completed with a voice recognition program.Efforts were made to edit the dictations but occasionally words are mis-transcribed.)      Phyllis Tesfaye MD  Resident  07/02/22 [id number]

## 2022-07-03 VITALS
RESPIRATION RATE: 22 BRPM | HEIGHT: 67 IN | TEMPERATURE: 97.7 F | HEART RATE: 83 BPM | WEIGHT: 240 LBS | DIASTOLIC BLOOD PRESSURE: 83 MMHG | SYSTOLIC BLOOD PRESSURE: 145 MMHG | OXYGEN SATURATION: 97 % | BODY MASS INDEX: 37.67 KG/M2

## 2022-07-03 LAB
CHOLESTEROL/HDL RATIO: 4
CHOLESTEROL: 144 MG/DL
ESTIMATED AVERAGE GLUCOSE: 114 MG/DL
HBA1C MFR BLD: 5.6 % (ref 4–6)
HCT VFR BLD CALC: 46.3 % (ref 40.7–50.3)
HDLC SERPL-MCNC: 36 MG/DL
HEMOGLOBIN: 15.5 G/DL (ref 13–17)
LDL CHOLESTEROL: 82 MG/DL (ref 0–130)
MCH RBC QN AUTO: 30.8 PG (ref 25.2–33.5)
MCHC RBC AUTO-ENTMCNC: 33.5 G/DL (ref 28.4–34.8)
MCV RBC AUTO: 92 FL (ref 82.6–102.9)
NRBC AUTOMATED: 0 PER 100 WBC
PDW BLD-RTO: 12.5 % (ref 11.8–14.4)
PLATELET # BLD: 180 K/UL (ref 138–453)
PMV BLD AUTO: 10.9 FL (ref 8.1–13.5)
RBC # BLD: 5.03 M/UL (ref 4.21–5.77)
TRIGL SERPL-MCNC: 132 MG/DL
WBC # BLD: 7.6 K/UL (ref 3.5–11.3)

## 2022-07-03 PROCEDURE — 85027 COMPLETE CBC AUTOMATED: CPT

## 2022-07-03 PROCEDURE — 6360000002 HC RX W HCPCS: Performed by: STUDENT IN AN ORGANIZED HEALTH CARE EDUCATION/TRAINING PROGRAM

## 2022-07-03 PROCEDURE — 36415 COLL VENOUS BLD VENIPUNCTURE: CPT

## 2022-07-03 PROCEDURE — 97161 PT EVAL LOW COMPLEX 20 MIN: CPT

## 2022-07-03 PROCEDURE — 97165 OT EVAL LOW COMPLEX 30 MIN: CPT

## 2022-07-03 PROCEDURE — 6370000000 HC RX 637 (ALT 250 FOR IP): Performed by: STUDENT IN AN ORGANIZED HEALTH CARE EDUCATION/TRAINING PROGRAM

## 2022-07-03 PROCEDURE — 97535 SELF CARE MNGMENT TRAINING: CPT

## 2022-07-03 PROCEDURE — 83036 HEMOGLOBIN GLYCOSYLATED A1C: CPT

## 2022-07-03 PROCEDURE — 99239 HOSP IP/OBS DSCHRG MGMT >30: CPT | Performed by: PSYCHIATRY & NEUROLOGY

## 2022-07-03 PROCEDURE — 97116 GAIT TRAINING THERAPY: CPT

## 2022-07-03 PROCEDURE — 92523 SPEECH SOUND LANG COMPREHEN: CPT

## 2022-07-03 PROCEDURE — 80061 LIPID PANEL: CPT

## 2022-07-03 RX ORDER — ASPIRIN 81 MG/1
81 TABLET ORAL DAILY
Qty: 30 TABLET | Refills: 3 | Status: SHIPPED | OUTPATIENT
Start: 2022-07-04

## 2022-07-03 RX ORDER — CLOPIDOGREL BISULFATE 75 MG/1
75 TABLET ORAL DAILY
Qty: 20 TABLET | Refills: 0 | Status: SHIPPED | OUTPATIENT
Start: 2022-07-04 | End: 2022-07-03

## 2022-07-03 RX ORDER — ASPIRIN 81 MG/1
81 TABLET ORAL DAILY
Qty: 30 TABLET | Refills: 3 | Status: SHIPPED | OUTPATIENT
Start: 2022-07-04 | End: 2022-07-03

## 2022-07-03 RX ORDER — ATORVASTATIN CALCIUM 40 MG/1
40 TABLET, FILM COATED ORAL NIGHTLY
Qty: 30 TABLET | Refills: 3 | Status: SHIPPED | OUTPATIENT
Start: 2022-07-03 | End: 2022-07-03

## 2022-07-03 RX ORDER — ATORVASTATIN CALCIUM 40 MG/1
40 TABLET, FILM COATED ORAL NIGHTLY
Qty: 30 TABLET | Refills: 3 | Status: SHIPPED | OUTPATIENT
Start: 2022-07-03

## 2022-07-03 RX ORDER — CLOPIDOGREL BISULFATE 75 MG/1
75 TABLET ORAL DAILY
Qty: 20 TABLET | Refills: 0 | Status: SHIPPED | OUTPATIENT
Start: 2022-07-04 | End: 2022-07-24

## 2022-07-03 RX ADMIN — ENOXAPARIN SODIUM 30 MG: 100 INJECTION SUBCUTANEOUS at 09:01

## 2022-07-03 RX ADMIN — Medication 81 MG: at 09:01

## 2022-07-03 RX ADMIN — CLOPIDOGREL 75 MG: 75 TABLET, FILM COATED ORAL at 09:01

## 2022-07-03 RX ADMIN — ATORVASTATIN CALCIUM 40 MG: 80 TABLET, FILM COATED ORAL at 00:53

## 2022-07-03 RX ADMIN — ENOXAPARIN SODIUM 30 MG: 100 INJECTION SUBCUTANEOUS at 00:54

## 2022-07-03 NOTE — PROGRESS NOTES
Speech Language Pathology  Facility/Department: [address] BURN UNIT  Initial Speech/Language/Cognitive Assessment    NAME: Antwon Hansen  : 1970   MRN: [id number]  ADMISSION DATE: 2022  ADMITTING DIAGNOSIS: has Acute ischemic right middle cerebral artery (MCA) stroke ([facility] [address] on their problem list.    Date of Eval: 7/3/2022   Evaluating Therapist: JEWEL Johnson    Primary Complaint: This is a 47 y.o. male who presents to the Emergency Department with complaint of left-sided lip and tongue tingling as well as facial tingling facial droop and dysarthria. This began approximately 1700 hrs. yesterday. Patient was seen outlying facility CT scan was concerning for a hypodense area in the brain and the patient was transferred for further neurological evaluation. Pain:  Pain Assessment  Pain Assessment: None - Denies Pain    Assessment:  Pt presents with no apparent cognitive deficits at this time. Mild dysarthria noted, minimal left facial weakness. Pt. Reports his dysarthria has improved since admission. ST to follow up to address noted deficits. Education provided. Recommendations:  Requires SLP Intervention: Yes  Further therapy recommended at discharge. Frequency: 3-5 x week    Plan:   Goals:  Short-term Goals  Goal 1: Pt. will complete OMEX for facial weakness 10-20 x per session. Goal 2: Pt. will utilize dysarthria compensatory strategies to increase speech clarity.    Patient/family involved in developing goals and treatment plan: yes    Subjective:     Social/Functional History  Lives With: Family  Active : Yes  Occupation: Full time employment  Vision  Vision: Within Functional Limits  Hearing  Hearing: Within functional limits     Objective:    Expression  Primary Mode of Expression: Verbal    Pragmatics/Social Functioning  Pragmatics: Within functional limits     Dysarthria: mild impairment, minimal left facial weakness    Cognition:      Orientation  Overall Orientation Status: Within Functional Limits  Attention  Attention: Within Functional Limits  Memory  Memory: Within Functional Limits  Problem Solving  Problem Solving: Within Functional Limits  Abstract Reasoning  Abstract Reasoning: Within Functional Limits  Safety/Judgment  Safety/Judgment: Within Functional Limits  Word Associations:  WFL  Verbal Sequencing: WFL    Prognosis:  Speech Therapy Prognosis  Prognosis: Fair  Individuals consulted  Consulted and agree with results and recommendations: Patient;RN;Family member  Family member consulted: wife    Education:  Patient Education Response: Verbalizes understanding          Therapy Time:   Individual Concurrent Group Co-treatment   Time In  9897         Time Out  1042         Minutes  [address]  CCC-SLP  7/3/2022 10:57 AM

## 2022-07-03 NOTE — PROGRESS NOTES
Physical Therapy  Facility/Department: [address] BURN UNIT  Physical Therapy Initial Assessment    Name: Ana María Low  : 1970  MRN: [id number]  Date of Service: 7/3/2022    Discharge Recommendations:  No therapy recommended at discharge   PT Equipment Recommendations  Equipment Needed: No      Patient Diagnosis(es): The primary encounter diagnosis was Acute right MCA stroke ([address] A diagnosis of Acute ischemic right middle cerebral artery (MCA) stroke (HCC) was also pertinent to this visit. Past Medical History:  has no past medical history on file. Past Surgical History:  has no past surgical history on file. Assessment   Assessment: Pt ambulated 240ft w/ no AD with supervision provided for safety; pt demonstrate independence in performance of functional mobility and verbalizes no concerns regards to functional mobility upon discharge. D/C skilled PT. Therapy Prognosis: Good  Decision Making: Low Complexity  Requires PT Follow-Up: No  Activity Tolerance  Activity Tolerance: Patient tolerated evaluation without incident     Plan   Plan  Plan Comment: D/C skilled PT.   Safety Devices  Type of Devices: Left in chair,Call light within reach,Gait belt,Nurse notified  Restraints  Restraints Initially in Place: No     Restrictions  Restrictions/Precautions  Restrictions/Precautions: Up as Tolerated  Required Braces or Orthoses?: No  Position Activity Restriction  Other position/activity restrictions: SBP <220     Subjective   General  Patient assessed for rehabilitation services?: Yes  Response To Previous Treatment: Not applicable  Family / Caregiver Present: No  Follows Commands: Within Functional Limits  Subjective  Subjective: RN and pt in agreement for PT evaluation; pt supine in bed upon PT arrival, pt pleasant and cooperative throughout session         Social/Functional History  Social/Functional History  Lives With: Family (Spouse, daughter/son in law/ grandchildren (16 month- 7 years))  Type of Home: House  Home Layout: Two level (\"Split level\")  Home Access: Stairs to enter with rails  Entrance Stairs - Number of Steps: 9  Entrance Stairs - Rails: Both  Bathroom Shower/Tub: Tub/Shower unit  Bathroom Toilet: Standard  Home Equipment:  (No DME)  ADL Assistance: Independent  Homemaking Assistance: Independent  Homemaking Responsibilities: Yes (Shares with wife)  Meal Prep Responsibility: Secondary  Laundry Responsibility: Secondary  Cleaning Responsibility: Secondary  Shopping Responsibility: Secondary  Ambulation Assistance: Independent  Transfer Assistance: Independent  Active : Yes  Mode of Transportation: Truck  Occupation: Full time employment  Type of Occupation: Construction  Leisure & Hobbies: Fishing  Additional Comments: Pt reports prn assist from family upon discharge  Vision/Hearing  Vision  Vision: Impaired  Vision Exceptions: Wears glasses for reading  Hearing  Hearing: Within functional limits    Cognition   Orientation  Overall Orientation Status: Within Functional Limits  Cognition  Overall Cognitive Status: WFL     Objective   Gross Assessment  Sensation: Intact     Joint Mobility  ROM RLE: WFL  ROM LLE: WFL  ROM RUE: WFL  ROM LUE: WFL  Strength RLE  Strength RLE: WFL  Strength LLE  Strength LLE: WFL  Strength RUE  Strength RUE: WFL  Strength LUE  Strength LUE: WFL        Balance  Sitting: Intact (EOB for ~2-3 minutes)  Standing: Intact (sinkside ADLs for ~3-4 minutes cumulative)  Transfer Training  Transfer Training: Yes  Overall Level of Assistance: Independent  Sit to Stand: Independent  Stand to Sit: Independent  Gait  Overall Level of Assistance: Independent  Bed mobility  Supine to Sit: Modified independent  Sit to Supine:  (Did not assess- pt seated in bedside recliner upon writer's exit)  Transfers  Sit to Stand: Supervision  Stand to sit: Supervision  Ambulation  Surface: level tile  Device: No Device  Assistance: Supervision  Distance: 240ft  Comments: No LOB noted throughout session; pt reports gait is baseline  More Ambulation?: No  Stairs/Curb  Stairs?: No     Balance  Posture: Good  Sitting - Static: Good  Sitting - Dynamic: Good  Standing - Static: Good  Standing - Dynamic: Good  Comments: standing balance assessed w/ no AD; pt able to sit independently    AM-PAC Score     AM-PAC Inpatient Mobility without Stair Climbing Raw Score : 20 (07/03/22 1253)  AM-PAC Inpatient without Stair Climbing T-Scale Score : 60.57 (07/03/22 1253)  Mobility Inpatient CMS 0-100% Score: 0 (07/03/22 1253)  Mobility Inpatient without Stair CMS G-Code Modifier : CH (07/03/22 1253)    Education  Patient Education  Education Given To: Patient  Education Provided: Role of Therapy;Orientation  Education Method: Demonstration;Verbal  Barriers to Learning: None  Education Outcome: Verbalized understanding;Demonstrated understanding      Therapy Time   Individual Concurrent Group Co-treatment   Time In 0816         Time Out 0835         Minutes 19         Timed Code Treatment Minutes: 8 Minutes       Rosana Arteaga, PT

## 2022-07-03 NOTE — ED NOTES
Patient Health Questionnaire-9 (PHQ-9)    Over the last 2 weeks, how often have you been bothered by any of the following problems? 1. Little Interest or pleasure in doing things? [x] Not at all  [] Several Days  [] More than half the day  []  Nearly every day    2. Feeling down, depressed or hopeless? [x] Not at all  [] Several Days  [] More than half the day  []  Nearly every day    3. Trouble falling or staying asleep, or sleeping too much? [x] Not at all  [] Several Days  [] More than half the day  []  Nearly every day    4. Feeling tired or having little energy? [x] Not at all  [] Several Days  [] More than half the day  []  Nearly every day    5. Poor apettite or overeating? [x] Not at all  [] Several Days  [] More than half the day  []  Nearly every day    6. Feeling bad about yourself-or that you are a failure or have let yourself or your family down? [x] Not at all  [] Several Days  [] More than half the day  []  Nearly every day    7. Trouble concentrating on things, such as reading the newspaper or watching television? [x] Not at all  [] Several Days  [] More than half the day  []  Nearly every day    8. Moving or speaking so slowly that other people could have noticed? Or the opposite-being so fidgety or restless that you have been moving around a lot more than usual?   [x] Not at all  [] Several Days  [] More than half the day  []  Nearly every day    9. Thoughts that you would be better off dead or of hurting yourself in some way? [x] Not at all  [] Several Days  [] More than half the day  []  Nearly every day    Total Score: 0    If you checked off any problems, how difficult have these problems made it for you to do your work, take care of things at home, or get along with other people?    [x] Not difficult at all  [] Somewhat Difficult  [] Very Difficult  []  Extremely Difficult     Joshua Alicea, [facility] [place]  07/03/22 5189

## 2022-07-03 NOTE — PLAN OF CARE
Problem: Discharge Planning  Goal: Discharge to home or other facility with appropriate resources  7/3/2022 1230 by Triny Felder RN  Outcome: Progressing  7/3/2022 0606 by Gopi Patel RN  Outcome: Progressing     Problem: Safety - Adult  Goal: Free from fall injury  Outcome: Progressing

## 2022-07-03 NOTE — PROGRESS NOTES
CLINICAL PHARMACY NOTE: MEDS TO BEDS    Total # of Prescriptions Filled: 3   The following medications were delivered to the patient:  · PLAVIX 75   · ASA   · LIPITOR 40     Additional Documentation:    PAID WITH CARD

## 2022-07-03 NOTE — DISCHARGE SUMMARY
[facility]     Department of Neurology    INPATIENT DISCHARGE SUMMARY        Patient Identification:  Roberto Heaton is a 47 y.o. male. :  1970  MRN: [id number]     Acct: [de-identified]   Admit Date:  2022  Discharge date and time: 7/3/2022  Attending Provider: Isadora Zimmer DO                                     Admission Diagnoses:   Acute right MCA stroke ([facility] [I63.511]  Stroke determined by clinical assessment [facility]) [I63.9]    Discharge Diagnoses:   Principal Problem:    Acute ischemic right middle cerebral artery (MCA) stroke ([facility]  Resolved Problems:    * No resolved hospital problems. *       Consults:   none    Brief Inpatient course:    Patient is a 65-year-old male with no significant PMH, presented to [facility] facility with sudden onset left perioral numbness, slurring of speech and dysphagia. Patient was seen via telestroke subsequent CT head showed subtle hypodensity in the right frontal area. Patient was transferred to [facility] for further management. Patient was loaded with aspirin 325 mg prior to transfer. Upon arrival patient was seen as a stroke alert, had an NIH of 3. Blood pressure was in the 150s, glucose in the 150s. Patient was taken for CTA head and neck which was unremarkable. Patient subsequently underwent MRI brain without contrast which showed acute infarct of the right frontal lobe with no evidence of hemorrhage transformation. Patient was admitted to the neurology stepdown unit for monitoring. His blood pressures remained stable. Patient was found to have an LDL of 82, HDL 36. Hemoglobin A1c pending at time of discharge. Patient was started on Lipitor 40 mg nightly for secondary stroke prevention. Patient was scheduled to undergo 2D echo along with SPENCER and loop recorder but the test could not be completed over the weekend due to Fourth of July holiday weekend.   Patient worked with PT and OT, did well without any need for significant therapy. Speech therapy recommended further therapy at discharge. Plan was discussed with patient and decision was made to proceed with all of this testing as outpatient. Patient lives near [place] and will proceed with testing over there. Patient was provided outpatient prescription for today echocardiogram, SPENCER and loop recorder placement and consult to schedule appointments as soon as possible. Patient was also provided an external referral for speech therapy. Patient was counseled regarding the need for neurology and cardiology follow-up, stated that he would follow-up with the neurology team at [facility] or a physician close to his home, will decide at a later point. Patient was discharged home on aspirin and Plavix for 21 days, monotherapy with aspirin from thereon along with Lipitor 40 mg nightly. Patient was agreeable to discharge and follow-up plan, expressed understanding. Patient is stable from neurological standpoint to be discharged. PHYSICAL EXAM:        CONSTITUTIONAL:  Well developed, well nourished, alert and oriented x 3, in no acute distress. GCS 15, nontoxic. Mild dysarthria, no aphasia .    HEAD:  normocephalic, atraumatic    EYES:  PERRLA, EOMI.   ENT:  moist mucous membranes   NECK:  supple, symmetric, no midline tenderness to palpation    BACK:  without midline tenderness, step-offs or deformities    LUNGS:  Equal air entry bilaterally   CARDIOVASCULAR:  normal s1 / s2   ABDOMEN:  Soft, no rigidity   NEUROLOGIC:  Mental Status:  A & O x3,awake             Cranial Nerves:    cranial nerves II-XII are grossly intact except for mild left-sided facial asymmetry     Motor Exam:    Drift:  absent  Tone:  normal     Motor exam is symmetrical 5 out of 5 all extremities bilaterally     Sensory:    Touch:    Right Upper Extremity:  normal  Left Upper Extremity:  normal  Right Lower Extremity:  normal  Left Lower Extremity:  normal     Deep Tendon Reflexes:    Right Bicep: 2+  Left Bicep:  2+  Right Knee:  2+  Left Knee:  2+     Plantar Response:  Right:  downgoing  Left:  downgoing     Clonus:  absent  Haskins's:  absent     Coordination/Dysmetria:  Heel to Shin:  Right:  normal  Finger to Nose:   Right:  normal  Left:  normal   Dysdiadochokinesia:  absent     Gait:  Deferred       SKIN:  no rash         Procedures:  None    Any Hospital Acquired Infections: none    Discharge Functional Status:  stable    Disposition: home    Patient Instructions:   Compliance with medications  Follow-up with neurology and cardiology as outpatient  Get 2D echocardiogram, SPENCER and loop recorder placement done as soon as possible      Discharge Medications:  Current Discharge Medication List      START taking these medications    Details   aspirin 81 MG EC tablet Take 1 tablet by mouth daily  Qty: 30 tablet, Refills: 3      atorvastatin (LIPITOR) 40 MG tablet Take 1 tablet by mouth nightly  Qty: 30 tablet, Refills: 3      clopidogrel (PLAVIX) 75 MG tablet Take 1 tablet by mouth daily for 20 doses  Qty: 20 tablet, Refills: 0             Activity: activity as tolerated    Restrictions: Driving No, Swimming No, Operating heavy machinery No, Compromising heights No    Diet: regular diet    Follow-up:    [address], [address]  [phone]  In 4 weeks  Hospital follow-up      Follow up labs: None    Follow up imaging: None    Note that over 30 minutes was spent in preparing discharge papers, discussing discharge with patient, medication review, etc.      Raymon Jhaveri MD,  Department of Neurology  [address]  7/3/2022, 11:09 AM

## 2022-07-03 NOTE — PROGRESS NOTES
Occupational Therapy  Facility/Department: [address] BURN UNIT  Occupational Therapy Initial Assessment    Name: Alice Carr  : 1970  MRN: [id number]  Date of Service: 7/3/2022  Chief Complaint   Patient presents with    Aphasia     LKWT 5pm yesterday, Prem transfer     Discharge Recommendations:  No therapy recommended at discharge. Patient Diagnosis(es): The primary encounter diagnosis was Acute right MCA stroke ([address] A diagnosis of Acute ischemic right middle cerebral artery (MCA) stroke (HCC) was also pertinent to this visit. Past Medical History:  has no past medical history on file. Past Surgical History:  has no past surgical history on file. Assessment   Assessment: Patient is currently completing ADLs at baseline level with no acute needs identified at this time. Pt demonstrates completing ADLs and functional tasks independently and reports no concerns upon discharge. OT to defer further treatment at this time, please re-consult if functional deficits arise. Prognosis: Good  Decision Making: Low Complexity  REQUIRES OT FOLLOW-UP: No  Activity Tolerance  Activity Tolerance: Patient Tolerated treatment well        Plan         Restrictions  Restrictions/Precautions  Restrictions/Precautions: Up as Tolerated  Required Braces or Orthoses?: No  Position Activity Restriction  Other position/activity restrictions: SBP <220    Subjective   General  Patient assessed for rehabilitation services?: Yes  Family / Caregiver Present: No  General Comment  Comments: RN ok'd patient for OT /PT evaluation. Pt pleasant and cooperative throughout, agreeable to evaluation. Pt declines any pain throughout.      Social/Functional History  Social/Functional History  Lives With: Family (Spouse, daughter/son in law/ grandchildren (16 month- 7 years))  Type of Home: House  Home Layout: Two level (\"Split level\")  Home Access: Stairs to enter with rails  Entrance Stairs - Number of Steps: 9  Entrance Stairs - Rails: Both  Bathroom Shower/Tub: Tub/Shower unit  Bathroom Toilet: Standard  Home Equipment:  (No DME)  ADL Assistance: Independent  Homemaking Assistance: Independent  Homemaking Responsibilities: Yes (Shares with wife)  Meal Prep Responsibility: Secondary  Laundry Responsibility: Secondary  Cleaning Responsibility: Secondary  Shopping Responsibility: Secondary  Ambulation Assistance: Independent  Transfer Assistance: Independent  Active : Yes  Mode of Transportation: Truck  Occupation: Full time employment  Type of Occupation: Construction  Leisure & Hobbies: Fishing  Additional Comments: Pt reports prn assist from family upon discharge    Objective    Vision: WFL   Hearing: [email or web address] IDE   Safety Devices  Type of Devices: Left in chair;Call light within reach;Gait belt;Nurse notified  Restraints  Restraints Initially in Place: No  Balance  Sitting: Intact (EOB for ~2-3 minutes)  Standing: Intact (sinkside ADLs for ~3-4 minutes cumulative)  Transfer Training  Transfer Training: Yes  Overall Level of Assistance: Independent  Sit to Stand: Independent  Stand to Sit: Independent  Gait  Overall Level of Assistance: Independent     AROM: Within functional limits  Strength: Within functional limits (B gross strength 5/5)  Coordination: Within functional limits  Tone: Normal  Sensation: Intact  ADL  Feeding: Independent  Grooming: Independent  UE Bathing: Independent  LE Bathing: Independent  UE Dressing: Independent  LE Dressing: Independent  Toileting: Independent  Additional Comments: Patient donned B socks independently long sitting in bed. Pt able to stand sinkside and complete grooming task independently with no deficits observed.      Bed mobility  Supine to Sit: Independent  Scooting: Independent  Bed Mobility Comments: Pt retired to bedside recliner at end of session     Cognition  Overall Cognitive Status: WFL  Orientation  Overall Orientation Status: Within Functional Limits     Education Given To: Patient  Education Provided: Role of Therapy;Plan of Care  Education Method: Verbal  Education Outcome: Verbalized understanding;Demonstrated understanding    AM-PAC Score        AM-PAC Inpatient Daily Activity Raw Score: 24 (07/03/22 1220)  AM-PAC Inpatient ADL T-Scale Score : 57.54 (07/03/22 1220)  ADL Inpatient CMS 0-100% Score: 0 (07/03/22 1220)  ADL Inpatient CMS G-Code Modifier : [facility] (07/03/22 1220)    Goals          Therapy Time   Individual Concurrent Group Co-treatment   Time In 8316         Time Out 0833         Minutes 19         Timed Code Treatment Minutes: 2300 Dinora Erickson, OTR/L

## 2022-07-03 NOTE — PLAN OF CARE
Problem: Chronic Conditions and Co-morbidities  Goal: Patient's chronic conditions and co-morbidity symptoms are monitored and maintained or improved  7/3/2022 1551 by Laura Heart RN  Outcome: Completed  Flowsheets (Taken 7/3/2022 0800)  Care Plan - Patient's Chronic Conditions and Co-Morbidity Symptoms are Monitored and Maintained or Improved:   Monitor and assess patient's chronic conditions and comorbid symptoms for stability, deterioration, or improvement   Collaborate with multidisciplinary team to address chronic and comorbid conditions and prevent exacerbation or deterioration   Update acute care plan with appropriate goals if chronic or comorbid symptoms are exacerbated and prevent overall improvement and discharge  7/3/2022 0606 by Asuncion Keller RN  Outcome: Progressing     Problem: Discharge Planning  Goal: Discharge to home or other facility with appropriate resources  7/3/2022 1551 by Laura Heart RN  Outcome: Completed  7/3/2022 1230 by Laura Heart RN  Outcome: Progressing  7/3/2022 0606 by Asuncion Keller RN  Outcome: Progressing     Problem: ABCDS Injury Assessment  Goal: Absence of physical injury  7/3/2022 1551 by Laura Heart RN  Outcome: Completed  7/3/2022 0606 by Asuncion Keller RN  Outcome: Progressing     Problem: Safety - Adult  Goal: Free from fall injury  7/3/2022 1551 by Laura Heart RN  Outcome: Completed  7/3/2022 1230 by Laura Heart RN  Outcome: Progressing

## 2022-07-05 LAB
EKG ATRIAL RATE: 72 BPM
EKG P AXIS: 26 DEGREES
EKG P-R INTERVAL: 154 MS
EKG Q-T INTERVAL: 388 MS
EKG QRS DURATION: 78 MS
EKG QTC CALCULATION (BAZETT): 424 MS
EKG R AXIS: 4 DEGREES
EKG T AXIS: -7 DEGREES
EKG VENTRICULAR RATE: 72 BPM

## 2022-07-05 PROCEDURE — 93010 ELECTROCARDIOGRAM REPORT: CPT | Performed by: INTERNAL MEDICINE

## 2022-07-06 NOTE — CARE COORDINATION
..Stroke Follow Up Phone Call    Called phone number on record - No answer.      []Attempt #1    [x]Attempt #2 (final attempt)      Electronically signed by Elijah Valencia RN on 7/6/22 at 3:11 PM EDT

## 2023-02-03 ENCOUNTER — APPOINTMENT (OUTPATIENT)
Dept: URBAN - METROPOLITAN AREA CLINIC 184 | Age: 53
Setting detail: DERMATOLOGY
End: 2023-02-03

## 2023-02-03 VITALS — WEIGHT: 237 LBS | HEIGHT: 67 IN

## 2023-02-03 DIAGNOSIS — L81.4 OTHER MELANIN HYPERPIGMENTATION: ICD-10-CM

## 2023-02-03 DIAGNOSIS — L91.8 OTHER HYPERTROPHIC DISORDERS OF THE SKIN: ICD-10-CM

## 2023-02-03 DIAGNOSIS — D22 MELANOCYTIC NEVI: ICD-10-CM

## 2023-02-03 DIAGNOSIS — L57.8 OTHER SKIN CHANGES DUE TO CHRONIC EXPOSURE TO NONIONIZING RADIATION: ICD-10-CM

## 2023-02-03 DIAGNOSIS — L82.1 OTHER SEBORRHEIC KERATOSIS: ICD-10-CM

## 2023-02-03 DIAGNOSIS — D485 NEOPLASM OF UNCERTAIN BEHAVIOR OF SKIN: ICD-10-CM

## 2023-02-03 PROBLEM — D48.5 NEOPLASM OF UNCERTAIN BEHAVIOR OF SKIN: Status: ACTIVE | Noted: 2023-02-03

## 2023-02-03 PROBLEM — D22.9 MELANOCYTIC NEVI, UNSPECIFIED: Status: ACTIVE | Noted: 2023-02-03

## 2023-02-03 PROCEDURE — 99203 OFFICE O/P NEW LOW 30 MIN: CPT | Mod: 25

## 2023-02-03 PROCEDURE — OTHER SUNSCREEN RECOMMENDATIONS: OTHER

## 2023-02-03 PROCEDURE — OTHER BIOPSY BY SHAVE METHOD: OTHER

## 2023-02-03 PROCEDURE — OTHER COUNSELING: OTHER

## 2023-02-03 PROCEDURE — OTHER MIPS QUALITY: OTHER

## 2023-02-03 PROCEDURE — 11102 TANGNTL BX SKIN SINGLE LES: CPT

## 2023-02-03 ASSESSMENT — LOCATION SIMPLE DESCRIPTION DERM
LOCATION SIMPLE: LEFT FOREHEAD
LOCATION SIMPLE: LEFT THIGH
LOCATION SIMPLE: LEFT EYEBROW
LOCATION SIMPLE: LEFT UPPER BACK

## 2023-02-03 ASSESSMENT — LOCATION DETAILED DESCRIPTION DERM
LOCATION DETAILED: LEFT MEDIAL FOREHEAD
LOCATION DETAILED: LEFT MID-UPPER BACK
LOCATION DETAILED: LEFT CENTRAL EYEBROW
LOCATION DETAILED: LEFT ANTERIOR PROXIMAL THIGH

## 2023-02-03 ASSESSMENT — LOCATION ZONE DERM
LOCATION ZONE: FACE
LOCATION ZONE: LEG
LOCATION ZONE: TRUNK

## 2025-02-07 ENCOUNTER — APPOINTMENT (OUTPATIENT)
Dept: URBAN - METROPOLITAN AREA CLINIC 184 | Age: 55
Setting detail: DERMATOLOGY
End: 2025-02-07

## 2025-02-07 DIAGNOSIS — L91.8 OTHER HYPERTROPHIC DISORDERS OF THE SKIN: ICD-10-CM

## 2025-02-07 PROCEDURE — OTHER MIPS QUALITY: OTHER

## 2025-02-07 PROCEDURE — OTHER BENIGN DESTRUCTION COSMETIC MULTI: OTHER

## 2025-02-07 ASSESSMENT — LOCATION SIMPLE DESCRIPTION DERM
LOCATION SIMPLE: RIGHT UPPER ARM
LOCATION SIMPLE: RIGHT THIGH
LOCATION SIMPLE: LEFT THIGH

## 2025-02-07 ASSESSMENT — LOCATION ZONE DERM
LOCATION ZONE: ARM
LOCATION ZONE: LEG

## 2025-02-07 ASSESSMENT — LOCATION DETAILED DESCRIPTION DERM
LOCATION DETAILED: RIGHT ANTERIOR PROXIMAL THIGH
LOCATION DETAILED: LEFT ANTERIOR PROXIMAL THIGH
LOCATION DETAILED: RIGHT ANTERIOR MEDIAL PROXIMAL UPPER ARM

## 2025-02-07 NOTE — PROCEDURE: BENIGN DESTRUCTION COSMETIC MULTI
Post-Care Instructions: I reviewed with the patient in detail post-care instructions. Patient is to wear sunprotection, and avoid picking at any of the treated lesions. Pt may apply Vaseline to crusted or scabbing areas.
Detail Level: Zone
Price (Use Numbers Only, No Special Characters Or $): 125
Anesthesia Type: 1% lidocaine with epinephrine and a 1:10 solution of 8.4% sodium bicarbonate
Total Number Of Lesions Treated: 3
Anesthesia Volume In Cc: 0.5
Consent: The patient's consent was obtained including but not limited to risks of crusting, scabbing, blistering, scarring, darker or lighter pigmentary change, recurrence, incomplete removal and infection.

## 2025-02-07 NOTE — HPI: SKIN LESION (IRRITATED SKIN TAGS)
How Severe Are They?: moderate
Is This A New Presentation, Or A Follow-Up?: Irritated Skin Lesions
Additional History: Pt here to have skin tags removed on both upper inner thighs. Discussed removal in February 2023, quoted $125 for up to 15 or less with LN2